# Patient Record
Sex: FEMALE | Race: BLACK OR AFRICAN AMERICAN | Employment: UNEMPLOYED | URBAN - METROPOLITAN AREA
[De-identification: names, ages, dates, MRNs, and addresses within clinical notes are randomized per-mention and may not be internally consistent; named-entity substitution may affect disease eponyms.]

---

## 2017-12-12 ENCOUNTER — HOSPITAL ENCOUNTER (OUTPATIENT)
Dept: MRI IMAGING | Age: 57
Discharge: HOME | End: 2017-12-12
Attending: PSYCHIATRY & NEUROLOGY
Payer: MEDICAID

## 2017-12-12 DIAGNOSIS — M54.16 LUMBAR RADICULOPATHY: ICD-10-CM

## 2017-12-12 PROCEDURE — 72148 MRI LUMBAR SPINE W/O DYE: CPT

## 2020-12-14 ENCOUNTER — TRANSCRIBE ORDER (OUTPATIENT)
Dept: SCHEDULING | Age: 60
End: 2020-12-14

## 2020-12-14 DIAGNOSIS — M51.36 DDD (DEGENERATIVE DISC DISEASE), LUMBAR: ICD-10-CM

## 2020-12-14 DIAGNOSIS — M54.16 LUMBAR RADICULOPATHY: Primary | ICD-10-CM

## 2021-02-10 ENCOUNTER — HOSPITAL ENCOUNTER (OUTPATIENT)
Dept: MRI IMAGING | Age: 61
Discharge: HOME | End: 2021-02-10
Attending: ORTHOPAEDIC SURGERY

## 2021-02-10 DIAGNOSIS — M51.36 DDD (DEGENERATIVE DISC DISEASE), LUMBAR: ICD-10-CM

## 2021-02-10 DIAGNOSIS — M54.16 LUMBAR RADICULOPATHY: ICD-10-CM

## 2021-02-10 NOTE — ROUTINE PROCESS
Patient is claustrophobic and unable to attempt open MRI Lumbar spine. Advised patient to speak with physician for further instruction.

## 2021-09-01 ENCOUNTER — HOSPITAL ENCOUNTER (OUTPATIENT)
Dept: MRI IMAGING | Age: 61
Discharge: HOME | End: 2021-09-01
Attending: ORTHOPAEDIC SURGERY
Payer: MEDICARE

## 2021-09-01 PROCEDURE — 72148 MRI LUMBAR SPINE W/O DYE: CPT

## 2022-03-16 ENCOUNTER — OFFICE VISIT (OUTPATIENT)
Dept: FAMILY MEDICINE CLINIC | Age: 62
End: 2022-03-16
Payer: MEDICARE

## 2022-03-16 VITALS
WEIGHT: 245 LBS | BODY MASS INDEX: 41.83 KG/M2 | RESPIRATION RATE: 20 BRPM | SYSTOLIC BLOOD PRESSURE: 140 MMHG | DIASTOLIC BLOOD PRESSURE: 87 MMHG | TEMPERATURE: 97.8 F | OXYGEN SATURATION: 94 % | HEIGHT: 64 IN | HEART RATE: 80 BPM

## 2022-03-16 DIAGNOSIS — Z79.899 ENCOUNTER FOR LONG-TERM (CURRENT) USE OF OTHER MEDICATIONS: ICD-10-CM

## 2022-03-16 DIAGNOSIS — F10.10 ALCOHOL ABUSE: ICD-10-CM

## 2022-03-16 DIAGNOSIS — K92.1 MELENA: Primary | ICD-10-CM

## 2022-03-16 DIAGNOSIS — E78.2 MIXED HYPERLIPIDEMIA: ICD-10-CM

## 2022-03-16 DIAGNOSIS — I10 HYPERTENSION, UNSPECIFIED TYPE: ICD-10-CM

## 2022-03-16 PROBLEM — F32.A DEPRESSION: Status: ACTIVE | Noted: 2018-03-16

## 2022-03-16 PROBLEM — F41.9 ANXIETY: Status: ACTIVE | Noted: 2018-03-16

## 2022-03-16 PROBLEM — M25.519 ARTHRALGIA OF SHOULDER: Status: ACTIVE | Noted: 2018-05-15

## 2022-03-16 PROBLEM — F17.200 TOBACCO DEPENDENCE SYNDROME: Status: ACTIVE | Noted: 2018-03-16

## 2022-03-16 PROBLEM — E11.9 DIABETES (HCC): Status: ACTIVE | Noted: 2018-08-02

## 2022-03-16 PROBLEM — M19.90 ARTHRITIS: Status: ACTIVE | Noted: 2018-05-15

## 2022-03-16 PROBLEM — M17.0 BILATERAL PRIMARY OSTEOARTHRITIS OF KNEE: Status: ACTIVE | Noted: 2022-01-26

## 2022-03-16 PROBLEM — J44.9 CHRONIC OBSTRUCTIVE PULMONARY DISEASE (HCC): Status: ACTIVE | Noted: 2018-05-15

## 2022-03-16 PROBLEM — E66.9 OBESITY: Status: ACTIVE | Noted: 2018-05-15

## 2022-03-16 LAB — HGB BLD-MCNC: 11.5 G/DL

## 2022-03-16 PROCEDURE — 99204 OFFICE O/P NEW MOD 45 MIN: CPT | Performed by: FAMILY MEDICINE

## 2022-03-16 PROCEDURE — 85018 HEMOGLOBIN: CPT | Performed by: FAMILY MEDICINE

## 2022-03-16 RX ORDER — IBUPROFEN 600 MG/1
TABLET ORAL
COMMUNITY
Start: 2022-02-23 | End: 2022-03-16

## 2022-03-16 RX ORDER — METFORMIN HYDROCHLORIDE 500 MG/1
500 TABLET ORAL
COMMUNITY

## 2022-03-16 RX ORDER — LANOLIN ALCOHOL/MO/W.PET/CERES
325 CREAM (GRAM) TOPICAL 2 TIMES DAILY
COMMUNITY
Start: 2021-06-14 | End: 2022-04-26 | Stop reason: SDUPTHER

## 2022-03-16 RX ORDER — AMITRIPTYLINE HYDROCHLORIDE 100 MG/1
TABLET, FILM COATED ORAL
COMMUNITY
Start: 2022-01-26 | End: 2022-04-26 | Stop reason: ALTCHOICE

## 2022-03-16 RX ORDER — PANTOPRAZOLE SODIUM 40 MG/1
40 TABLET, DELAYED RELEASE ORAL DAILY
COMMUNITY
Start: 2021-12-09

## 2022-03-16 RX ORDER — ACETAMINOPHEN 325 MG/1
TABLET ORAL
COMMUNITY
Start: 2022-02-23

## 2022-03-16 RX ORDER — ESCITALOPRAM OXALATE 10 MG/1
TABLET ORAL
COMMUNITY
Start: 2021-11-16

## 2022-03-16 RX ORDER — LISINOPRIL 40 MG/1
TABLET ORAL
COMMUNITY
Start: 2021-12-20

## 2022-03-16 RX ORDER — DIAZEPAM 5 MG/1
10 TABLET ORAL
COMMUNITY
Start: 2021-08-05 | End: 2022-03-16

## 2022-03-16 RX ORDER — ALBUTEROL SULFATE 90 UG/1
2 AEROSOL, METERED RESPIRATORY (INHALATION)
COMMUNITY
Start: 2021-09-30

## 2022-03-16 NOTE — PROGRESS NOTES
[facility]    History of Present Illness: Evan Rodriguez is a 59 y.o. female here for   Chief Complaint   Patient presents with   [facility] Establish Care    Anal Bleeding         HPI:  Patient is a former patient mine who comes here today due to melena. She noticed it on Thursday morning dark stool along with blood. It occurred throughout the weekend but she has not seen it since Sunday. She admits to alcohol abuse and last drank on Wednesday. Her colonoscopy was approximately 5 years ago. She had some lower left quadrant abdominal pain but no diarrhea or constipation. No DTs. Past Medical, Family, and Social History:     Past Medical History:   Diagnosis Date    Alcohol abuse     Depression     Hypertension       Current Outpatient Medications   Medication Sig Dispense Refill    acetaminophen (TYLENOL) 325 mg tablet TAKE TWO TABLETS BY MOUTH EVERY 6 HOURS AS NEEDED FOR MILD PAIN FOR UP TO 10 DAYS      albuterol (PROVENTIL HFA, VENTOLIN HFA, PROAIR HFA) 90 mcg/actuation inhaler Take 2 Puffs by inhalation every four (4) hours as needed.  amitriptyline (ELAVIL) 100 mg tablet TAKE ONE TABLET BY MOUTH AT BEDTIME FOR 30 DAYS      escitalopram oxalate (LEXAPRO) 10 mg tablet TAKE ONE TABLET BY MOUTH DAILY. (dose increase on 11/12/21)      ferrous sulfate 325 mg (65 mg iron) tablet Take 325 mg by mouth two (2) times a day.  lisinopriL (PRINIVIL, ZESTRIL) 40 mg tablet TAKE ONE TABLET BY MOUTH EVERY DAY, REPLACES LISINOPRIL/HCTZ      metFORMIN (GLUCOPHAGE) 500 mg tablet 500 mg. If BS over 150      pantoprazole (PROTONIX) 40 mg tablet Take 40 mg by mouth daily. Patient Active Problem List   Diagnosis Code    Alcohol abuse F10.10    Anxiety F41.9    Arthralgia of shoulder M25.519    Back pain M54.9    Arthritis M19.90    Bilateral primary osteoarthritis of knee M17.0    Chronic obstructive pulmonary disease ([address] J44.9    Depression F32. A    Diabetes ([address] E11.9    Hypertension I10    Obesity E66.9    Tobacco dependence syndrome F17.200       Social History     Socioeconomic History    Marital status: SINGLE   Tobacco Use    Smoking status: Current Every Day Smoker     Packs/day: 0.50     Years: 10.00     Pack years: 5.00     Types: Cigarettes    Smokeless tobacco: Never Used   Substance and Sexual Activity    Alcohol use: Yes    Drug use: Never        Review of Systems   Review of Systems   Constitutional: Negative for chills and fever. Respiratory: Negative for cough and shortness of breath. Cardiovascular: Negative for chest pain. Gastrointestinal: Positive for abdominal pain, blood in stool, melena and nausea. Negative for constipation, diarrhea and vomiting. Musculoskeletal: Positive for back pain. Objective:     Visit Vitals  BP (!) (P) 142/88   Pulse 80   Temp 97.8 °F (36.6 °C) (Oral)   Resp 20   Ht 5' 4\" (1.626 m)   Wt 245 lb (111.1 kg)   SpO2 94%   BMI 42.05 kg/m²        Physical Exam  PHYSICAL EXAM:  Gen: Pt sitting in chair, in NAD  Head: Normocephalic, atraumatic  Eyes: Sclera anicteric, EOM grossly intact,  Ears: TM's pearly with good light reflex b/l  Neck: Supple, no carotid bruits  CVS: Normal S1, S2, no m/r/g  Resp: CTAB, no wheezes or rales  Abd: Soft, non-tender, non-distended, +BS  Extrem: Atraumatic, no cyanosis or edema  Neuro: Alert, oriented, appropriate    Pertinent Labs/Studies:  Testing preformed onsite at today's visit:  Results for orders placed or performed in visit on 03/16/22   AMB POC HEMOGLOBIN (HGB)   Result Value Ref Range    Hemoglobin (POC) 11.5 G/DL       Assessment and orders:       ICD-10-CM ICD-9-CM    1. Melena  K92.1 578.1 COLLECTION CAPILLARY BLOOD SPECIMEN      AMB POC HEMOGLOBIN (HGB)      REFERRAL TO SURGERY   2. Alcohol abuse  F10.10 305.00    3. Hypertension, unspecified type  E85 944.5 METABOLIC PANEL, COMPREHENSIVE   4.  Encounter for long-term (current) use of other medications  Z79.899 V58.69 CBC W/O DIFF      METABOLIC PANEL, COMPREHENSIVE   5. Mixed hyperlipidemia  E78.2 272.2 LIPID PANEL     Diagnoses and all orders for this visit:    1. Melena  -     COLLECTION CAPILLARY BLOOD SPECIMEN  -     AMB POC HEMOGLOBIN (HGB)  -     REFERRAL TO SURGERY  lab results and schedule of future lab studies reviewed with patient  She is slightly anemic and I have advised her to continue iron. Urgent referral for EGD and colonoscopy. I have advised her that if symptoms recur or worsen to go to the ED. Advised the patient to stop all Advil Aleve due to risk of GI bleeding. Have encouraged her to continue to abstain from alcohol and have advised her to join AA. 2. Alcohol abuse   per above    3. Hypertension, unspecified type  -     METABOLIC PANEL, COMPREHENSIVE; Future  Uncontrolled, blood pressure slightly elevated here today advised to continue lisinopril and recheck here in 2 weeks. 4. Encounter for long-term (current) use of other medications  -     CBC W/O DIFF; Future  -     METABOLIC PANEL, COMPREHENSIVE; Future    5. Mixed hyperlipidemia  -     LIPID PANEL; Future          Spent 35 min with patient, reviewing chart and face to face exam, clinical documentation. I have discussed the diagnosis with the patient and the intended plan as seen in the above orders. Social history, medical history, and labs were reviewed. The patient has received an after-visit summary and questions were answered concerning future plans. I have discussed medication side effects and warnings with the patient as well. Patient/guardian verbalized understanding and accepts plan & risks.      MD ADRIANA Grullon & ANTONY KC [facility]  03/16/22

## 2022-03-16 NOTE — PROGRESS NOTES
1. \"Have you been to the ER, urgent care clinic since your last visit? Hospitalized since your last visit? \" No    2. \"Have you seen or consulted any other health care providers outside of the [address] [place] [address] since your last visit? \" No     3. For patients aged 39-70: Has the patient had a colonoscopy / FIT/ Cologuard? Yes - Care Gap present. Rooming MA/LPN to request most recent results      If the patient is female:    4. For patients aged 41-77: Has the patient had a mammogram within the past 2 years? No      5. For patients aged 21-65: Has the patient had a pap smear? Yes - Care Gap present.  Rooming MA/LPN to request most recent results    Health Maintenance Due   Topic Date Due    Hepatitis C Screening  Never done    Depression Screen  Never done    DTaP/Tdap/Td series (1 - Tdap) Never done    Cervical cancer screen  Never done    Lipid Screen  Never done    Colorectal Cancer Screening Combo  Never done    Shingrix Vaccine Age 50> (1 of 2) Never done    Breast Cancer Screen Mammogram  Never done    Flu Vaccine (1) Never done    Medicare Yearly Exam  Never done

## 2022-03-16 NOTE — PATIENT INSTRUCTIONS
DASH Diet: Care Instructions  Your Care Instructions     The DASH diet is an eating plan that can help lower your blood pressure. DASH stands for Dietary Approaches to Stop Hypertension. Hypertension is high blood pressure. The DASH diet focuses on eating foods that are high in calcium, potassium, and magnesium. These nutrients can lower blood pressure. The foods that are highest in these nutrients are fruits, vegetables, low-fat dairy products, nuts, seeds, and legumes. But taking calcium, potassium, and magnesium supplements instead of eating foods that are high in those nutrients does not have the same effect. The DASH diet also includes whole grains, fish, and poultry. The DASH diet is one of several lifestyle changes your doctor may recommend to lower your high blood pressure. Your doctor may also want you to decrease the amount of sodium in your diet. Lowering sodium while following the DASH diet can lower blood pressure even further than just the DASH diet alone. Follow-up care is a key part of your treatment and safety. Be sure to make and go to all appointments, and call your doctor if you are having problems. It's also a good idea to know your test results and keep a list of the medicines you take. How can you care for yourself at home? Following the DASH diet  · Eat 4 to 5 servings of fruit each day. A serving is 1 medium-sized piece of fruit, ½ cup chopped or canned fruit, 1/4 cup dried fruit, or 4 ounces (½ cup) of fruit juice. Choose fruit more often than fruit juice. · Eat 4 to 5 servings of vegetables each day. A serving is 1 cup of lettuce or raw leafy vegetables, ½ cup of chopped or cooked vegetables, or 4 ounces (½ cup) of vegetable juice. Choose vegetables more often than vegetable juice. · Get 2 to 3 servings of low-fat and fat-free dairy each day. A serving is 8 ounces of milk, 1 cup of yogurt, or 1 ½ ounces of cheese. · Eat 6 to 8 servings of grains each day.  A serving is 1 slice of bread, 1 ounce of dry cereal, or ½ cup of cooked rice, pasta, or cooked cereal. Try to choose whole-grain products as much as possible. · Limit lean meat, poultry, and fish to 2 servings each day. A serving is 3 ounces, about the size of a deck of cards. · Eat 4 to 5 servings of nuts, seeds, and legumes (cooked dried beans, lentils, and split peas) each week. A serving is 1/3 cup of nuts, 2 tablespoons of seeds, or ½ cup of cooked beans or peas. · Limit fats and oils to 2 to 3 servings each day. A serving is 1 teaspoon of vegetable oil or 2 tablespoons of salad dressing. · Limit sweets and added sugars to 5 servings or less a week. A serving is 1 tablespoon jelly or jam, ½ cup sorbet, or 1 cup of lemonade. · Eat less than 2,300 milligrams (mg) of sodium a day. If you limit your sodium to 1,500 mg a day, you can lower your blood pressure even more. · Be aware that all of these are the suggested number of servings for people who eat 1,800 to 2,000 calories a day. Your recommended number of servings may be different if you need more or fewer calories. Tips for success  · Start small. Do not try to make dramatic changes to your diet all at once. You might feel that you are missing out on your favorite foods and then be more likely to not follow the plan. Make small changes, and stick with them. Once those changes become habit, add a few more changes. · Try some of the following:  ? Make it a goal to eat a fruit or vegetable at every meal and at snacks. This will make it easy to get the recommended amount of fruits and vegetables each day. ? Try yogurt topped with fruit and nuts for a snack or healthy dessert. ? Add lettuce, tomato, cucumber, and onion to sandwiches. ? Combine a ready-made pizza crust with low-fat mozzarella cheese and lots of vegetable toppings. Try using tomatoes, squash, spinach, broccoli, carrots, cauliflower, and onions. ?  Have a variety of cut-up vegetables with a low-fat dip as an appetizer instead of chips and dip. ? Sprinkle sunflower seeds or chopped almonds over salads. Or try adding chopped walnuts or almonds to cooked vegetables. ? Try some vegetarian meals using beans and peas. Add garbanzo or kidney beans to salads. Make burritos and tacos with mashed morales beans or black beans. Where can you learn more? Go to http://www.abel.com/  Enter H967 in the search box to learn more about \"DASH Diet: Care Instructions. \"  Current as of: January 10, 2022               Content Version: 13.2  © 6233-5124 [employer]. Care instructions adapted under license by [employer] (which disclaims liability or warranty for this information). If you have questions about a medical condition or this instruction, always ask your healthcare professional. [address] any warranty or liability for your use of this information.

## 2022-03-19 PROBLEM — M47.817 SPONDYLOSIS OF LUMBOSACRAL REGION WITHOUT MYELOPATHY OR RADICULOPATHY: Status: ACTIVE | Noted: 2022-03-19

## 2022-03-24 ENCOUNTER — TELEPHONE (OUTPATIENT)
Dept: FAMILY MEDICINE CLINIC | Age: 62
End: 2022-03-24

## 2022-04-15 ENCOUNTER — TELEPHONE (OUTPATIENT)
Dept: FAMILY MEDICINE CLINIC | Age: 62
End: 2022-04-15

## 2022-04-26 ENCOUNTER — OFFICE VISIT (OUTPATIENT)
Dept: FAMILY MEDICINE CLINIC | Age: 62
End: 2022-04-26
Payer: MEDICARE

## 2022-04-26 VITALS
HEART RATE: 97 BPM | DIASTOLIC BLOOD PRESSURE: 92 MMHG | SYSTOLIC BLOOD PRESSURE: 162 MMHG | BODY MASS INDEX: 39.85 KG/M2 | HEIGHT: 64 IN | WEIGHT: 233.4 LBS | RESPIRATION RATE: 20 BRPM | OXYGEN SATURATION: 95 % | TEMPERATURE: 98.5 F

## 2022-04-26 DIAGNOSIS — E11.9 TYPE 2 DIABETES MELLITUS WITHOUT COMPLICATION, WITHOUT LONG-TERM CURRENT USE OF INSULIN (HCC): ICD-10-CM

## 2022-04-26 DIAGNOSIS — K74.60 CIRRHOSIS OF LIVER WITHOUT ASCITES, UNSPECIFIED HEPATIC CIRRHOSIS TYPE (HCC): ICD-10-CM

## 2022-04-26 DIAGNOSIS — Z23 ENCOUNTER FOR IMMUNIZATION: ICD-10-CM

## 2022-04-26 DIAGNOSIS — F10.10 ALCOHOL ABUSE: ICD-10-CM

## 2022-04-26 DIAGNOSIS — Z79.899 ENCOUNTER FOR LONG-TERM (CURRENT) USE OF OTHER MEDICATIONS: ICD-10-CM

## 2022-04-26 DIAGNOSIS — Z11.59 NEED FOR HEPATITIS C SCREENING TEST: ICD-10-CM

## 2022-04-26 DIAGNOSIS — D64.9 ANEMIA, UNSPECIFIED TYPE: Primary | ICD-10-CM

## 2022-04-26 DIAGNOSIS — Z00.01 ENCOUNTER FOR GENERAL ADULT MEDICAL EXAMINATION WITH ABNORMAL FINDINGS: ICD-10-CM

## 2022-04-26 DIAGNOSIS — I10 HYPERTENSION, UNSPECIFIED TYPE: ICD-10-CM

## 2022-04-26 DIAGNOSIS — F32.A DEPRESSION, UNSPECIFIED DEPRESSION TYPE: ICD-10-CM

## 2022-04-26 DIAGNOSIS — E78.2 MIXED HYPERLIPIDEMIA: ICD-10-CM

## 2022-04-26 PROCEDURE — 99214 OFFICE O/P EST MOD 30 MIN: CPT | Performed by: FAMILY MEDICINE

## 2022-04-26 PROCEDURE — G0439 PPPS, SUBSEQ VISIT: HCPCS | Performed by: FAMILY MEDICINE

## 2022-04-26 RX ORDER — BUPROPION HYDROCHLORIDE 150 MG/1
150 TABLET ORAL DAILY
Qty: 30 TABLET | Refills: 0 | Status: SHIPPED | OUTPATIENT
Start: 2022-04-26

## 2022-04-26 RX ORDER — AMLODIPINE BESYLATE 2.5 MG/1
2.5 TABLET ORAL DAILY
Qty: 30 TABLET | Refills: 5 | Status: SHIPPED | OUTPATIENT
Start: 2022-04-26

## 2022-04-26 RX ORDER — LANOLIN ALCOHOL/MO/W.PET/CERES
325 CREAM (GRAM) TOPICAL 2 TIMES DAILY
Qty: 90 TABLET | Refills: 1 | Status: SHIPPED | OUTPATIENT
Start: 2022-04-26

## 2022-04-26 NOTE — PATIENT INSTRUCTIONS

## 2022-04-26 NOTE — PROGRESS NOTES
[facility]    History of Present Illness: Jamal Brunson is a 59 y.o. female here for   Chief Complaint   Patient presents with    Follow-up     labs         HPI:  Patient is here for follow-up hypertension as well as other issues. She did not take her blood pressure medicine this this morning. Yesterday her blood pressure at home was 135/95. Scheduled May 12 for colonoscopy due to recent melena. She has not seen any further blood. She has cut back on alcohol has not stopped  drinking entirely. She has gone to [place] [id number] in the past.  She also smokes as well and would like to try medicine to help with that as well. Health Maintenance  Health Maintenance Due   Topic Date Due    Hepatitis C Screening  Never done    Pneumococcal 0-64 years (1 - PCV) Never done    Foot Exam Q1  Never done    A1C test (Diabetic or Prediabetic)  Never done    MICROALBUMIN Q1  Never done    Eye Exam Retinal or Dilated  Never done    Lipid Screen  Never done    DTaP/Tdap/Td series (1 - Tdap) Never done    Shingrix Vaccine Age 50> (1 of 2) Never done    Medicare Yearly Exam  Never done       Past Medical, Family, and Social History:     Past Medical History:   Diagnosis Date    Alcohol abuse     Antritis (stomach)     Cirrhosis ([facility] [address]     Depression     Esophagitis     Hypertension     Ovarian teratoma, left       Current Outpatient Medications on File Prior to Visit   Medication Sig Dispense Refill    acetaminophen (TYLENOL) 325 mg tablet TAKE TWO TABLETS BY MOUTH EVERY 6 HOURS AS NEEDED FOR MILD PAIN FOR UP TO 10 DAYS      albuterol (PROVENTIL HFA, VENTOLIN HFA, PROAIR HFA) 90 mcg/actuation inhaler Take 2 Puffs by inhalation every four (4) hours as needed.       amitriptyline (ELAVIL) 100 mg tablet TAKE ONE TABLET BY MOUTH AT BEDTIME FOR 30 DAYS      escitalopram oxalate (LEXAPRO) 10 mg tablet TAKE ONE TABLET BY MOUTH DAILY. (dose increase on 11/12/21)      ferrous sulfate 325 mg (65 mg iron) tablet Take 325 mg by mouth two (2) times a day.  lisinopriL (PRINIVIL, ZESTRIL) 40 mg tablet TAKE ONE TABLET BY MOUTH EVERY DAY, REPLACES LISINOPRIL/HCTZ      metFORMIN (GLUCOPHAGE) 500 mg tablet 500 mg. If BS over 150      pantoprazole (PROTONIX) 40 mg tablet Take 40 mg by mouth daily. No current facility-administered medications on file prior to visit. Patient Active Problem List   Diagnosis Code    Alcohol abuse F10.10    Anxiety F41.9    Arthralgia of shoulder M25.519    Back pain M54.9    Arthritis M19.90    Bilateral primary osteoarthritis of knee M17.0    Chronic obstructive pulmonary disease ([facility] [address] J44.9    Depression F32. A    Diabetes ([facility] [address] E11.9    Hypertension I10    Obesity E66.9    Tobacco dependence syndrome F17.200    Cirrhosis of liver (HCC) K74.60    Spondylosis of lumbosacral region without myelopathy or radiculopathy M47.817       Social History     Socioeconomic History    Marital status: SINGLE   Tobacco Use    Smoking status: Current Every Day Smoker     Packs/day: 0.50     Years: 10.00     Pack years: 5.00     Types: Cigarettes    Smokeless tobacco: Never Used   Substance and Sexual Activity    Alcohol use: Yes    Drug use: Never        Review of Systems   Review of Systems   Constitutional: Negative for chills and fever. Respiratory: Negative for cough and shortness of breath. Cardiovascular: Negative for chest pain. Gastrointestinal: Negative for blood in stool and melena. Psychiatric/Behavioral: Positive for depression.        Objective:     Visit Vitals  BP (!) 167/98 (BP 1 Location: Right upper arm, BP Patient Position: Sitting, BP Cuff Size: Large adult)   Pulse 97   Temp 98.5 °F (36.9 °C) (Oral)   Resp 20   Ht 5' 4\" (1.626 m)   Wt 233 lb 6.4 oz (105.9 kg)   SpO2 95%   BMI 40.06 kg/m²        Physical Exam  PHYSICAL EXAM:  Gen: Pt sitting in chair, in NAD  Head: Normocephalic, atraumatic  Eyes: Sclera anicteric, EOM grossly intact,  Ears: TM's pearly with good light reflex b/l  Neck: Supple, no  carotid bruits  CVS: Normal S1, S2, no m/r/g  Resp: CTAB, no wheezes or rales  Abd: Soft, non-tender, non-distended, +BS  Extrem: Atraumatic, no cyanosis or edema  Pulses: 2+   Skin: Warm, dry  Neuro: Alert, oriented, appropriate    Pertinent Labs/Studies:  Diabetic foot exam:        Left Foot:   Visual Exam: normal    Pulse DP: 2+ (normal)   Filament test: normal sensation          Right Foot:   Visual Exam: normal    Pulse DP: 2+ (normal)   Filament test: normal sensation          Assessment and orders:       ICD-10-CM ICD-9-CM    1. Anemia, unspecified type  D64.9 285.9 ferrous sulfate 325 mg (65 mg iron) tablet   2. Type 2 diabetes mellitus without complication, without long-term current use of insulin (HCC)  E11.9 250.00 MICROALBUMIN, UR, RAND W/ MICROALB/CREAT RATIO      MICROALBUMIN, UR, RAND W/ MICROALB/CREAT RATIO   3. Alcohol abuse  F10.10 305.00    4. Cirrhosis of liver without ascites, unspecified hepatic cirrhosis type (HCC)  K74.60 571.5 HEPATITIS C AB      HEPATITIS C AB   5. Depression, unspecified depression type  F32. A 311 buPROPion XL (WELLBUTRIN XL) 150 mg tablet   6. Hypertension, unspecified type  I10 401.9 amLODIPine (NORVASC) 2.5 mg tablet      METABOLIC PANEL, COMPREHENSIVE   7. Mixed hyperlipidemia  E78.2 272.2 LIPID PANEL   8. Encounter for long-term (current) use of other medications  H10.419 A45.17 METABOLIC PANEL, COMPREHENSIVE      CBC W/O DIFF                   Diagnoses and all orders for this visit:    1. Anemia, unspecified type  -     ferrous sulfate 325 mg (65 mg iron) tablet; Take 1 Tablet by mouth two (2) times a day. 2. Type 2 diabetes mellitus without complication, without long-term current use of insulin (HCC)  -     MICROALBUMIN, UR, RAND W/ MICROALB/CREAT RATIO; Future    3. Alcohol abuse  Discussed techniques to quit drinking. Advised patient to attend AA meetings again.   Discussed risk of worsening cirrhosis if she continues to drink. 4. Cirrhosis of liver without ascites, unspecified hepatic cirrhosis type ([facility] [address]  -     HEPATITIS C AB; Future    5. Depression, unspecified depression type  -     buPROPion XL (WELLBUTRIN XL) 150 mg tablet; Take 1 Tablet by mouth daily. 6. Hypertension, unspecified type  -     amLODIPine (NORVASC) 2.5 mg tablet; Take 1 Tablet by mouth daily.  -     METABOLIC PANEL, COMPREHENSIVE  Pressure not at goal even at home so we will add amlodipine and recheck in a few weeks. 7. Mixed hyperlipidemia  -     LIPID PANEL    8. Encounter for long-term (current) use of other medications  -     METABOLIC PANEL, COMPREHENSIVE  -     CBC W/O DIFF            Follow-up and Dispositions    · Return in about 4 weeks (around 5/24/2022). reviewed diet, exercise and weight control  very strongly urged to quit smoking to reduce cardiovascular risk      I have discussed the diagnosis with the patient and the intended plan as seen in the above orders. Social history, medical history, and labs were reviewed. The patient has received an after-visit summary and questions were answered concerning future plans. I have discussed medication side effects and warnings with the patient as well. Patient/guardian verbalized understanding and accepts plan & risks.      MD ADRIANA Haji & ANTONY KC [facility]  04/26/22

## 2022-04-26 NOTE — PROGRESS NOTES
Date of visit: 2022   The following Annual Medicare Wellness Exam is distinct and separate from the medical evaluation and decision making. This is a Subsequent Medicare Annual Wellness Visit (AWV), (Performed more than 12 months after effective date of Medicare Part B enrollment and 12 months after last preventive visit, Once in a lifetime)    I have reviewed the patient's medical history in detail and updated the computerized patient record. Lindsay Wright is a 59 y.o. female   History obtained from: the patient. Individual medical history and medications as well as vital signs were reviewed today. Smoking status, fall risk assessment were reviewed with patient. History     Patient Active Problem List   Diagnosis Code    Alcohol abuse F10.10    Anxiety F41.9    Arthralgia of shoulder M25.519    Back pain M54.9    Arthritis M19.90    Bilateral primary osteoarthritis of knee M17.0    Chronic obstructive pulmonary disease ([address] J44.9    Depression F32. A    Diabetes ([address] E11.9    Hypertension I10    Obesity E66.9    Tobacco dependence syndrome F17.200    Cirrhosis of liver (HCC) K74.60    Spondylosis of lumbosacral region without myelopathy or radiculopathy M47.817     Past Medical History:   Diagnosis Date    Alcohol abuse     Antritis (stomach)     Cirrhosis (HCC)     Depression     Esophagitis     Hypertension     Ovarian teratoma, left       Past Surgical History:   Procedure Laterality Date    HX  SECTION       Allergies   Allergen Reactions    Latex Hives and Itching     Reaction Type: Allergy    Tramadol Hives and Itching    Aspirin Other (comments)     Reaction Type: Allergy; Reaction(s): GI BLEED     Current Outpatient Medications   Medication Sig Dispense Refill    ferrous sulfate 325 mg (65 mg iron) tablet Take 1 Tablet by mouth two (2) times a day. 90 Tablet 1    buPROPion XL (WELLBUTRIN XL) 150 mg tablet Take 1 Tablet by mouth daily.  27 Tablet 0    amLODIPine (NORVASC) 2.5 mg tablet Take 1 Tablet by mouth daily. 30 Tablet 5    acetaminophen (TYLENOL) 325 mg tablet TAKE TWO TABLETS BY MOUTH EVERY 6 HOURS AS NEEDED FOR MILD PAIN FOR UP TO 10 DAYS      albuterol (PROVENTIL HFA, VENTOLIN HFA, PROAIR HFA) 90 mcg/actuation inhaler Take 2 Puffs by inhalation every four (4) hours as needed.  escitalopram oxalate (LEXAPRO) 10 mg tablet TAKE ONE TABLET BY MOUTH DAILY. (dose increase on 11/12/21)      lisinopriL (PRINIVIL, ZESTRIL) 40 mg tablet TAKE ONE TABLET BY MOUTH EVERY DAY, REPLACES LISINOPRIL/HCTZ      metFORMIN (GLUCOPHAGE) 500 mg tablet 500 mg. If BS over 150      pantoprazole (PROTONIX) 40 mg tablet Take 40 mg by mouth daily. History reviewed. No pertinent family history. Social History     Tobacco Use    Smoking status: Current Every Day Smoker     Packs/day: 0.50     Years: 10.00     Pack years: 5.00     Types: Cigarettes    Smokeless tobacco: Never Used   Substance Use Topics    Alcohol use: Yes       Specialists/Care Team   Cortney Ambrose has established care with the following healthcare providers:  Patient Care Team:  Phyllis Luna MD as PCP - General (Family Medicine)  Phyllis Luna MD as PCP - Javi Sifuentes Provider    Health Risk Assessment     Demographics   female  59 y.o. General Health Questions   -During the past 4 weeks:   -how would you rate your health in general? Fair   -Have you noticed any hearing difficulties? no    Emotional Health Questions   -Do you have a history of depression, anxiety, or emotional problems? yes  -Over the past 2 weeks, have you felt down, depressed or hopeless? yes  -Over the past 2 weeks, have you felt little interest or pleasure in doing things? no    Health Habits   Do you get 5 servings of fruits or vegetables daily? no  Do you exercise regularly?  no    Activities of Daily Living and Functional Status   -Do you need help with eating, walking, dressing, bathing, toileting, the phone, transportation, shopping, preparing meals, housework, laundry, medications or managing money? no  -Are you confident are you that you can control and manage most of your health problems? yes    Fall Risk and Home Safety   Have you fallen 2 or more times in the past year? no        Physical Examination     Vitals:    04/26/22 1120 04/26/22 1151   BP: (!) 167/98 (!) 162/92   Pulse: 97    Resp: 20    Temp: 98.5 °F (36.9 °C)    TempSrc: Oral    SpO2: 95%    Weight: 233 lb 6.4 oz (105.9 kg)    Height: 5' 4\" (1.626 m)      Body mass index is 40.06 kg/m². No exam data present    Evaluation of Cognitive Function   Mood/affect:  happy  Appearance: age appropriate and casually dressed  Word recall 3/3  Clock drawing done          Preventive Services   Reviewed with patient if applicable:  Pneumococcal vaccines   Flu vaccine   Hepatitis B vaccine (if at risk)   Shingles vaccine   TDAP vaccine   COV-19 vaccine      Colorectal cancer screening - scheduled   Low-dose CT for lung cancer screening   Bone density test                          Assessment/Plan   Z00.01    ICD-10-CM ICD-9-CM    1. Anemia, unspecified type  D64.9 285.9 ferrous sulfate 325 mg (65 mg iron) tablet   2. Type 2 diabetes mellitus without complication, without long-term current use of insulin (HCC)  E11.9 250.00 MICROALBUMIN, UR, RAND W/ MICROALB/CREAT RATIO      MICROALBUMIN, UR, RAND W/ MICROALB/CREAT RATIO   3. Alcohol abuse  F10.10 305.00    4. Cirrhosis of liver without ascites, unspecified hepatic cirrhosis type (HCC)  K74.60 571.5 HEPATITIS C AB      HEPATITIS C AB   5. Depression, unspecified depression type  F32. A 311 buPROPion XL (WELLBUTRIN XL) 150 mg tablet   6. Hypertension, unspecified type  I10 401.9 amLODIPine (NORVASC) 2.5 mg tablet      METABOLIC PANEL, COMPREHENSIVE   7. Mixed hyperlipidemia  E78.2 272.2 LIPID PANEL   8.  Encounter for long-term (current) use of other medications  O20.703 X72.83 METABOLIC PANEL, COMPREHENSIVE CBC W/O DIFF   9. Encounter for general adult medical examination with abnormal findings  Z00.01 V70.0        Orders Placed This Encounter    MICROALBUMIN, UR, RAND W/ MICROALB/CREAT RATIO    HEPATITIS C AB    ferrous sulfate 325 mg (65 mg iron) tablet    buPROPion XL (WELLBUTRIN XL) 150 mg tablet    amLODIPine (NORVASC) 2.5 mg tablet       Follow-up and Dispositions    · Return in about 4 weeks (around 5/24/2022). Advised shingrix at outside pharmacy. Consider CT lung cancer screening after colonoscopy. Mammo UTD.  Pap due 8/23       Lauren Masterson MD

## 2022-04-26 NOTE — PROGRESS NOTES
1. \"Have you been to the ER, urgent care clinic since your last visit? Hospitalized since your last visit? \" No    2. \"Have you seen or consulted any other health care providers outside of the [address] [place] [address] since your last visit? \" No     3. For patients aged 39-70: Has the patient had a colonoscopy / FIT/ Cologuard? No      If the patient is female:    4. For patients aged 41-77: Has the patient had a mammogram within the past 2 years? Yes - no Care Gap present      5. For patients aged 21-65: Has the patient had a pap smear?  No    Health Maintenance Due   Topic Date Due    Hepatitis C Screening  Never done    Pneumococcal 0-64 years (1 - PCV) Never done    Foot Exam Q1  Never done    A1C test (Diabetic or Prediabetic)  Never done    MICROALBUMIN Q1  Never done    Eye Exam Retinal or Dilated  Never done    Lipid Screen  Never done    DTaP/Tdap/Td series (1 - Tdap) Never done    Shingrix Vaccine Age 50> (1 of 2) Never done    Medicare Yearly Exam  Never done

## 2022-04-27 ENCOUNTER — TELEPHONE (OUTPATIENT)
Dept: FAMILY MEDICINE CLINIC | Age: 62
End: 2022-04-27

## 2022-04-28 LAB
CREAT UR-MCNC: 266 MG/DL
MICROALBUMIN UR-MCNC: 1.92 MG/DL
MICROALBUMIN/CREAT UR-RTO: 7 MG/G (ref 0–30)

## 2022-05-31 ENCOUNTER — TELEPHONE (OUTPATIENT)
Dept: FAMILY MEDICINE CLINIC | Age: 62
End: 2022-05-31

## 2022-05-31 NOTE — TELEPHONE ENCOUNTER
----- Message from Greg Barrera sent at 5/27/2022  4:31 PM EDT -----  Subject: Appointment Request    Reason for Call: Routine (Patient Request) No Script    QUESTIONS  Type of Appointment? Established Patient  Reason for appointment request? No appointments available during search  Additional Information for Provider? Mert Marks needs to make a f/u appt with   Connor Gregory, who was not listed on her chart as her PCP but should be, so I   could not find an appointment for her. Appt needed for Lipid and A1C  ---------------------------------------------------------------------------  --------------  CALL BACK INFO  What is the best way for the office to contact you? OK to leave message on   voicemail  Preferred Call Back Phone Number? [phone]  ---------------------------------------------------------------------------  --------------  SCRIPT ANSWERS  Relationship to Patient? Self  Specialty Confirmation? Primary Care  (Is the patient requesting to see the provider for a procedure?)? No  (Is the patient requesting to see the provider urgently  today or   tomorrow. )? No  Have you been diagnosed with, awaiting test results for, or told that you   are suspected of having COVID-19 (Coronavirus)? (If patient has tested   negative or was tested as a requirement for work, school, or travel and   not based on symptoms, answer no)? No  Within the past 10 days have you developed any of the following symptoms   (answer no if symptoms have been present longer than 10 days or began   more than 10 days ago)? Fever or Chills, Cough, Shortness of breath or   difficulty breathing, Loss of taste or smell, Sore throat, Nasal   congestion, Sneezing or runny nose, Fatigue or generalized body aches   (answer no if pain is specific to a body part e.g. back pain), Diarrhea,   Headache? No  Have you had close contact with someone with COVID-19 in the last 7 days?    No  (Service Expert  click yes below to proceed with [employer] As Usual Scheduling)?  Yes

## 2022-06-02 ENCOUNTER — TELEPHONE (OUTPATIENT)
Dept: FAMILY MEDICINE CLINIC | Age: 62
End: 2022-06-02

## 2022-07-13 ENCOUNTER — TELEPHONE (OUTPATIENT)
Dept: FAMILY MEDICINE CLINIC | Age: 62
End: 2022-07-13

## 2022-07-13 NOTE — TELEPHONE ENCOUNTER
Call made to pt, no answer, mess left. Pt is due for a F/U appt and labs (A1C) with Dr. Jean.  Please schedule